# Patient Record
Sex: MALE | Race: WHITE | NOT HISPANIC OR LATINO | Employment: UNEMPLOYED | URBAN - METROPOLITAN AREA
[De-identification: names, ages, dates, MRNs, and addresses within clinical notes are randomized per-mention and may not be internally consistent; named-entity substitution may affect disease eponyms.]

---
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
## 2020-05-21 ENCOUNTER — OFFICE VISIT (OUTPATIENT)
Dept: DERMATOLOGY | Facility: CLINIC | Age: 20
End: 2020-05-21
Payer: COMMERCIAL

## 2020-05-21 VITALS — TEMPERATURE: 98.1 F | HEIGHT: 73 IN | WEIGHT: 260 LBS | BODY MASS INDEX: 34.46 KG/M2

## 2020-05-21 DIAGNOSIS — D22.9 BENIGN MOLE: Primary | ICD-10-CM

## 2020-05-21 DIAGNOSIS — D48.5 NEOPLASM OF UNCERTAIN BEHAVIOR OF SKIN: ICD-10-CM

## 2020-05-21 PROCEDURE — 99203 OFFICE O/P NEW LOW 30 MIN: CPT | Performed by: DERMATOLOGY

## 2020-05-21 PROCEDURE — 88305 TISSUE EXAM BY PATHOLOGIST: CPT | Performed by: STUDENT IN AN ORGANIZED HEALTH CARE EDUCATION/TRAINING PROGRAM

## 2020-05-21 PROCEDURE — 11102 TANGNTL BX SKIN SINGLE LES: CPT | Performed by: DERMATOLOGY

## 2020-06-04 DIAGNOSIS — Z20.822 CLOSE EXPOSURE TO COVID-19 VIRUS: ICD-10-CM

## 2020-06-04 PROCEDURE — U0003 INFECTIOUS AGENT DETECTION BY NUCLEIC ACID (DNA OR RNA); SEVERE ACUTE RESPIRATORY SYNDROME CORONAVIRUS 2 (SARS-COV-2) (CORONAVIRUS DISEASE [COVID-19]), AMPLIFIED PROBE TECHNIQUE, MAKING USE OF HIGH THROUGHPUT TECHNOLOGIES AS DESCRIBED BY CMS-2020-01-R: HCPCS

## 2020-06-06 LAB — SARS-COV-2 RNA SPEC QL NAA+PROBE: NOT DETECTED

## 2020-11-24 ENCOUNTER — CONSULT (OUTPATIENT)
Dept: PLASTIC SURGERY | Facility: CLINIC | Age: 20
End: 2020-11-24
Payer: COMMERCIAL

## 2020-11-24 VITALS
WEIGHT: 245 LBS | HEIGHT: 73 IN | DIASTOLIC BLOOD PRESSURE: 82 MMHG | SYSTOLIC BLOOD PRESSURE: 126 MMHG | BODY MASS INDEX: 32.47 KG/M2

## 2020-11-24 DIAGNOSIS — L98.9 SCALP LESION: Primary | ICD-10-CM

## 2020-11-24 PROCEDURE — 99203 OFFICE O/P NEW LOW 30 MIN: CPT | Performed by: STUDENT IN AN ORGANIZED HEALTH CARE EDUCATION/TRAINING PROGRAM

## 2020-11-24 RX ORDER — IBUPROFEN 200 MG
600 TABLET ORAL EVERY 6 HOURS PRN
COMMUNITY

## 2020-12-11 ENCOUNTER — ANESTHESIA EVENT (OUTPATIENT)
Dept: PERIOP | Facility: HOSPITAL | Age: 20
End: 2020-12-11
Payer: COMMERCIAL

## 2020-12-16 ENCOUNTER — ANESTHESIA (OUTPATIENT)
Dept: PERIOP | Facility: HOSPITAL | Age: 20
End: 2020-12-16
Payer: COMMERCIAL

## 2020-12-23 ENCOUNTER — HOSPITAL ENCOUNTER (OUTPATIENT)
Facility: HOSPITAL | Age: 20
Setting detail: OUTPATIENT SURGERY
Discharge: HOME | End: 2020-12-23
Attending: STUDENT IN AN ORGANIZED HEALTH CARE EDUCATION/TRAINING PROGRAM | Admitting: STUDENT IN AN ORGANIZED HEALTH CARE EDUCATION/TRAINING PROGRAM
Payer: COMMERCIAL

## 2020-12-23 VITALS
OXYGEN SATURATION: 99 % | DIASTOLIC BLOOD PRESSURE: 56 MMHG | HEIGHT: 73 IN | BODY MASS INDEX: 33.13 KG/M2 | HEART RATE: 55 BPM | SYSTOLIC BLOOD PRESSURE: 113 MMHG | RESPIRATION RATE: 18 BRPM | TEMPERATURE: 98 F | WEIGHT: 250 LBS

## 2020-12-23 VITALS — HEART RATE: 73 BPM

## 2020-12-23 DIAGNOSIS — L98.9 SCALP LESION: Primary | ICD-10-CM

## 2020-12-23 PROCEDURE — 11421 EXC H-F-NK-SP B9+MARG 0.6-1: CPT | Performed by: STUDENT IN AN ORGANIZED HEALTH CARE EDUCATION/TRAINING PROGRAM

## 2020-12-23 PROCEDURE — 12031 INTMD RPR S/A/T/EXT 2.5 CM/<: CPT | Performed by: STUDENT IN AN ORGANIZED HEALTH CARE EDUCATION/TRAINING PROGRAM

## 2020-12-23 PROCEDURE — 99024 POSTOP FOLLOW-UP VISIT: CPT | Performed by: STUDENT IN AN ORGANIZED HEALTH CARE EDUCATION/TRAINING PROGRAM

## 2020-12-23 PROCEDURE — 88305 TISSUE EXAM BY PATHOLOGIST: CPT | Performed by: PATHOLOGY

## 2020-12-23 RX ORDER — LIDOCAINE HYDROCHLORIDE AND EPINEPHRINE 20; 5 MG/ML; UG/ML
INJECTION, SOLUTION EPIDURAL; INFILTRATION; INTRACAUDAL; PERINEURAL AS NEEDED
Status: DISCONTINUED | OUTPATIENT
Start: 2020-12-23 | End: 2020-12-23 | Stop reason: HOSPADM

## 2020-12-23 RX ORDER — HYDROMORPHONE HCL/PF 1 MG/ML
0.2 SYRINGE (ML) INJECTION
Status: DISCONTINUED | OUTPATIENT
Start: 2020-12-23 | End: 2020-12-23 | Stop reason: HOSPADM

## 2020-12-23 RX ORDER — FENTANYL CITRATE/PF 50 MCG/ML
25 SYRINGE (ML) INJECTION
Status: DISCONTINUED | OUTPATIENT
Start: 2020-12-23 | End: 2020-12-23 | Stop reason: HOSPADM

## 2020-12-23 RX ORDER — CEFAZOLIN SODIUM 1 G/3ML
INJECTION, POWDER, FOR SOLUTION INTRAMUSCULAR; INTRAVENOUS AS NEEDED
Status: DISCONTINUED | OUTPATIENT
Start: 2020-12-23 | End: 2020-12-23

## 2020-12-23 RX ORDER — LIDOCAINE HYDROCHLORIDE 10 MG/ML
0.5 INJECTION, SOLUTION EPIDURAL; INFILTRATION; INTRACAUDAL; PERINEURAL ONCE AS NEEDED
Status: DISCONTINUED | OUTPATIENT
Start: 2020-12-23 | End: 2020-12-23 | Stop reason: HOSPADM

## 2020-12-23 RX ORDER — FENTANYL CITRATE 50 UG/ML
INJECTION, SOLUTION INTRAMUSCULAR; INTRAVENOUS AS NEEDED
Status: DISCONTINUED | OUTPATIENT
Start: 2020-12-23 | End: 2020-12-23

## 2020-12-23 RX ORDER — ONDANSETRON 2 MG/ML
4 INJECTION INTRAMUSCULAR; INTRAVENOUS ONCE AS NEEDED
Status: DISCONTINUED | OUTPATIENT
Start: 2020-12-23 | End: 2020-12-23 | Stop reason: HOSPADM

## 2020-12-23 RX ORDER — SODIUM CHLORIDE, SODIUM LACTATE, POTASSIUM CHLORIDE, CALCIUM CHLORIDE 600; 310; 30; 20 MG/100ML; MG/100ML; MG/100ML; MG/100ML
125 INJECTION, SOLUTION INTRAVENOUS CONTINUOUS
Status: DISCONTINUED | OUTPATIENT
Start: 2020-12-23 | End: 2020-12-23 | Stop reason: HOSPADM

## 2020-12-23 RX ORDER — MIDAZOLAM HYDROCHLORIDE 2 MG/2ML
INJECTION, SOLUTION INTRAMUSCULAR; INTRAVENOUS AS NEEDED
Status: DISCONTINUED | OUTPATIENT
Start: 2020-12-23 | End: 2020-12-23

## 2020-12-23 RX ORDER — ONDANSETRON 2 MG/ML
INJECTION INTRAMUSCULAR; INTRAVENOUS AS NEEDED
Status: DISCONTINUED | OUTPATIENT
Start: 2020-12-23 | End: 2020-12-23

## 2020-12-23 RX ADMIN — CEFAZOLIN 2000 MG: 1 INJECTION, POWDER, FOR SOLUTION INTRAVENOUS at 08:45

## 2020-12-23 RX ADMIN — FENTANYL CITRATE 50 MCG: 50 INJECTION INTRAMUSCULAR; INTRAVENOUS at 08:46

## 2020-12-23 RX ADMIN — MIDAZOLAM 1 MG: 1 INJECTION INTRAMUSCULAR; INTRAVENOUS at 08:36

## 2020-12-23 RX ADMIN — SODIUM CHLORIDE, SODIUM LACTATE, POTASSIUM CHLORIDE, AND CALCIUM CHLORIDE: .6; .31; .03; .02 INJECTION, SOLUTION INTRAVENOUS at 07:46

## 2020-12-23 RX ADMIN — MIDAZOLAM 1 MG: 1 INJECTION INTRAMUSCULAR; INTRAVENOUS at 08:32

## 2020-12-23 RX ADMIN — ONDANSETRON 4 MG: 2 INJECTION INTRAMUSCULAR; INTRAVENOUS at 08:47

## 2020-12-23 RX ADMIN — FENTANYL CITRATE 25 MCG: 50 INJECTION INTRAMUSCULAR; INTRAVENOUS at 08:38

## 2020-12-23 RX ADMIN — FENTANYL CITRATE 25 MCG: 50 INJECTION INTRAMUSCULAR; INTRAVENOUS at 08:58

## 2021-03-10 DIAGNOSIS — Z23 ENCOUNTER FOR IMMUNIZATION: ICD-10-CM

## 2021-03-14 ENCOUNTER — IMMUNIZATIONS (OUTPATIENT)
Dept: FAMILY MEDICINE CLINIC | Facility: HOSPITAL | Age: 21
End: 2021-03-14

## 2021-03-14 DIAGNOSIS — Z23 ENCOUNTER FOR IMMUNIZATION: Primary | ICD-10-CM

## 2021-03-14 PROCEDURE — 91300 SARS-COV-2 / COVID-19 MRNA VACCINE (PFIZER-BIONTECH) 30 MCG: CPT

## 2021-03-14 PROCEDURE — 0001A SARS-COV-2 / COVID-19 MRNA VACCINE (PFIZER-BIONTECH) 30 MCG: CPT

## 2021-04-05 ENCOUNTER — IMMUNIZATIONS (OUTPATIENT)
Dept: FAMILY MEDICINE CLINIC | Facility: HOSPITAL | Age: 21
End: 2021-04-05

## 2021-04-05 DIAGNOSIS — Z23 ENCOUNTER FOR IMMUNIZATION: Primary | ICD-10-CM

## 2021-04-05 PROCEDURE — 91300 SARS-COV-2 / COVID-19 MRNA VACCINE (PFIZER-BIONTECH) 30 MCG: CPT

## 2021-04-05 PROCEDURE — 0002A SARS-COV-2 / COVID-19 MRNA VACCINE (PFIZER-BIONTECH) 30 MCG: CPT

## 2022-04-27 ENCOUNTER — OFFICE VISIT (OUTPATIENT)
Dept: DERMATOLOGY | Facility: CLINIC | Age: 22
End: 2022-04-27
Payer: COMMERCIAL

## 2022-04-27 VITALS — WEIGHT: 254 LBS | BODY MASS INDEX: 33.66 KG/M2 | HEIGHT: 73 IN | TEMPERATURE: 98.4 F

## 2022-04-27 DIAGNOSIS — B07.9 VERRUCA VULGARIS: Primary | ICD-10-CM

## 2022-04-27 PROCEDURE — 99214 OFFICE O/P EST MOD 30 MIN: CPT | Performed by: DERMATOLOGY

## 2022-04-27 RX ORDER — SALICYLIC ACID 285 MG/ML
SOLUTION TOPICAL
Qty: 10 ML | Refills: 3 | Status: SHIPPED | OUTPATIENT
Start: 2022-04-27 | End: 2022-06-25 | Stop reason: ALTCHOICE

## 2022-04-27 NOTE — PATIENT INSTRUCTIONS
1  VERRUCA VULGARIS ("Common Warts")    Assessment and Plan:  Based on a thorough discussion of this condition and the management approach to it (including a comprehensive discussion of the known risks, side effects and potential benefits of treatment), the patient (family) agrees to implement the following specific plan:   Discussed treatment options such as laser, topical medication, topical medication, or freezing   UltraSal-ER external Solution 28 5%- apply topically 1-2 times daily to warts until warts are resolved   If no improvement please let us know in a couple months after using the topical medication  Verruca Vulgaris  A verruca is a common growth of the skin caused by infection by human papilloma virus (HPV)  There are many strains of the virus that cause different types of warts on the body  The virus infects the most superficial layers of the skin, causing increased production of skin cells and thickening  Warts can be spread through direct contact with infected skin and may spread to other parts of the body if scratched or picked  A verruca is more commonly called a "wart " Warts are particularly common in school-aged children but can arise at any age  Patients who have a history of eczema are especially prone due to impaired skin barrier  Those taking immunosuppressive drugs or with HIV infections may experience prolonged symptoms despite treatment  Warts generally have a rough surface with a tiny black dot sometimes observed in the middle of each scaly spot  They can range in size from a small bump to large scaly growths  Common warts are often found on the backs of fingers or toes, around the nails, and on the knees  Plantar warts can grow inwardly on the soles of the feet causing pain  There are many possible ways to treat warts and sometimes several different treatments are needed to get the warts to go away completely   There is no single perfect treatment for warts, and successful treatment can take many months  In-office treatments usually require multiple visits, and include:  1) Cryotherapy  a cold spray with liquid nitrogen will destroy the infected cells but may lead to discomfort and blistering  It may also leave a permanent white betito or scar  2) Electrosurgery (curettage and cautery) can be used for large resistant warts which involves shaving the growth down and burning the base  It is performed under local anesthesia and may leave a permanent scar    3) Candida (yeast) antigen injections  These are extracts of the common yeast (Candida) that cannot cause an infection  The medication is injected into/under the wart  It is thought to stimulate the immune system to recognize the wart virus and attack it  Multiple injections are often needed about one month apart  There are also several at-home wart treatments:    1) Soak the warts in warm water for 5 minutes every night followed by gentle filing with a nail file or pumice stone  2) Topical salicylic acid or similar compounds work by removing the dead surface skin cells  a  Apply the medicine directly to the wart, wait for it to dry completely, then cover with duct tape overnight   b  Repeat until the wart is gone, which can take 2-4 months  c  Do not use on the face or groin area   d  If the wart paint makes the skin sore, stop treatment until the discomfort has settled, then recommence as above   e  Take care to keep the chemical off normal skin  3) Podophyllin is a cytotoxic agent used in some products and must not be used in pregnancy or women considering pregnancy  4) Some prescription medications include   a  Topical retinoids (adapalene, tretinoin, tazarotene), 5-fluorouracil (Efudex) or imiquimod (Aldara) creams are sometimes used to treat flat warts or warts on the face and other sensitive anatomical areas   They are usually applied directly to the warts once a day for 2-4 months and can be irritating  These treatments should only be used as directed by your health care provider  b  Systemic treatment with oral cimetidine (Tagamet) may help boost the immune system against the wart virus in patients, some of the time  Initiation of cimetidine therapy should ONLY be done under the supervision of your health care provider, who can discuss possible side effects and drug-to-drug interactions of this specific treatment

## 2022-04-27 NOTE — PROGRESS NOTES
[facility] Follow Up Note    Patient Name: Demetrice Connor  Encounter Date: 04/27/2022    Today's Chief Concerns:  [facility] Concern #1:  Follow up [place]       Current Medications:    Current Outpatient Medications:     ibuprofen (MOTRIN) 200 mg tablet, Take 600 mg by mouth every 6 (six) hours as needed for mild pain , Disp: , Rfl:     CONSTITUTIONAL:   Vitals:    04/27/22 1442   Temp: 98 4 °F (36 9 °C)   TempSrc: Temporal   Weight: 115 kg (254 lb)   Height: 6' 1" (1 854 m)       Specific Alerts:    Have you been seen by a [facility] Dermatologist in the last 3 years? YES    Are you pregnant or planning to become pregnant? N/A    Are you currently or planning to be nursing or breast feeding? N/A    No Known Allergies    May we call your Preferred Phone number to discuss your specific medical information? YES    May we leave a detailed message that includes your specific medical information? YES    Have you traveled outside of the [facility] in the past 3 months? No    Do you currently have a pacemaker or defibrillator? No    Do you have any artificial heart valves, joints, plates, screws, rods, stents, pins, etc? No   - If Yes, were any placed within the last 2 years? Do you require any medications prior to a surgical procedure? No    Are you taking any medications that cause you to bleed more easily ("blood thinners") No    Have you ever experienced a rapid heartbeat with epinephrine? No    Have you ever been treated with "gold" (gold sodium thiomalate) therapy? No    [facility] can help with wrinkles, "laugh lines," facial volume loss, "double chin," "love handles," age spots, and more  Are you interested in learning today about some of the skin enhancement procedures that we offer? (If Yes, please provide more detail) No    Review of Systems:  Have you recently had or currently have any of the following?     · Fever or chills: No  · Night Sweats: No  · Headaches: No  · Weight Gain: No  · Weight Loss: No  · Blurry Vision: No  · Nausea: No  · Vomiting: No  · Diarrhea: No  · Blood in Stool: No  · Abdominal Pain: No  · Itchy Skin: No  · Painful Joints: No  · Swollen Joints: No  · Muscle Pain: No  · Irregular Mole: No  · Sun Burn: No  · Dry Skin: No  · Skin Color Changes: No  · Scar or Keloid: No  · Cold Sores/Fever Blisters: No  · Bacterial Infections/MRSA: No  · Anxiety: No  · Depression: No  · Suicidal or Homicidal Thoughts: No      PSYCH: Normal mood and affect  EYES: Normal conjunctiva  ENT: Normal lips and oral mucosa  CARDIOVASCULAR: No edema  RESPIRATORY: Normal respirations      1  VERRUCA VULGARIS ("Common Warts")    Physical Exam:   Anatomic Location Affected:  6 on right hand, 2 on left hand, 1 on right knee, and 1 on left shin    Morphological Description:  Verruca papule (see photographs)   Pertinent Positives:   Pertinent Negatives: Additional History of Present Condition:  Presents for 1 year  Patient has tried OTC wart removal treatments with no relief  Assessment and Plan:  Based on a thorough discussion of this condition and the management approach to it (including a comprehensive discussion of the known risks, side effects and potential benefits of treatment), the patient (family) agrees to implement the following specific plan:   Discussed treatment options such as laser, topical medication, topical medication, or freezing   UltraSal-ER external Solution 28 5%- apply topically 1-2 times daily to warts until warts are resolved   If no improvement please let us know in a couple months after using the topical medication  Verruca Vulgaris  A verruca is a common growth of the skin caused by infection by human papilloma virus (HPV)  There are many strains of the virus that cause different types of warts on the body   The virus infects the most superficial layers of the skin, causing increased production of skin cells and thickening  Warts can be spread through direct contact with infected skin and may spread to other parts of the body if scratched or picked  A verruca is more commonly called a "wart " Warts are particularly common in school-aged children but can arise at any age  Patients who have a history of eczema are especially prone due to impaired skin barrier  Those taking immunosuppressive drugs or with HIV infections may experience prolonged symptoms despite treatment  Warts generally have a rough surface with a tiny black dot sometimes observed in the middle of each scaly spot  They can range in size from a small bump to large scaly growths  Common warts are often found on the backs of fingers or toes, around the nails, and on the knees  Plantar warts can grow inwardly on the soles of the feet causing pain  There are many possible ways to treat warts and sometimes several different treatments are needed to get the warts to go away completely  There is no single perfect treatment for warts, and successful treatment can take many months  In-office treatments usually require multiple visits, and include:  1) Cryotherapy  a cold spray with liquid nitrogen will destroy the infected cells but may lead to discomfort and blistering  It may also leave a permanent white betito or scar  2) Electrosurgery (curettage and cautery) can be used for large resistant warts which involves shaving the growth down and burning the base  It is performed under local anesthesia and may leave a permanent scar    3) Candida (yeast) antigen injections  These are extracts of the common yeast (Candida) that cannot cause an infection  The medication is injected into/under the wart  It is thought to stimulate the immune system to recognize the wart virus and attack it  Multiple injections are often needed about one month apart      There are also several at-home wart treatments:    1) Soak the warts in warm water for 5 minutes every night followed by gentle filing with a nail file or pumice stone  2) Topical salicylic acid or similar compounds work by removing the dead surface skin cells  a  Apply the medicine directly to the wart, wait for it to dry completely, then cover with duct tape overnight   b  Repeat until the wart is gone, which can take 2-4 months  c  Do not use on the face or groin area   d  If the wart paint makes the skin sore, stop treatment until the discomfort has settled, then recommence as above   e  Take care to keep the chemical off normal skin  3) Podophyllin is a cytotoxic agent used in some products and must not be used in pregnancy or women considering pregnancy  4) Some prescription medications include   a  Topical retinoids (adapalene, tretinoin, tazarotene), 5-fluorouracil (Efudex) or imiquimod (Aldara) creams are sometimes used to treat flat warts or warts on the face and other sensitive anatomical areas  They are usually applied directly to the warts once a day for 2-4 months and can be irritating  These treatments should only be used as directed by your health care provider  b  Systemic treatment with oral cimetidine (Tagamet) may help boost the immune system against the wart virus in patients, some of the time  Initiation of cimetidine therapy should ONLY be done under the supervision of your health care provider, who can discuss possible side effects and drug-to-drug interactions of this specific treatment         Scribe Attestation    I,:  Gavin Sharpe am acting as a scribe while in the presence of the attending physician :       I,:  Evans Shelton MD personally performed the services described in this documentation    as scribed in my presence :

## 2022-05-04 ENCOUNTER — TELEPHONE (OUTPATIENT)
Dept: DERMATOLOGY | Facility: CLINIC | Age: 22
End: 2022-05-04

## 2022-05-04 NOTE — TELEPHONE ENCOUNTER
Patient's mom called regarding medication not covered  Please advise  Hi, my name is Axel Rudy  I'm calling in reference to my son, Torres Barajas is his date of birth  He was seen at your office and was prescribed a medication  Apparently when he went to [facility], because our insurance is through  [facility], it is not a covered medication  I'm not sure what the protocol is  If the pharmacy contact you  But we were just wondering, is there another medication that can be called in or could the pharmacy possibly get a prior authorization on that medication you can call [employer]  It's in his chart  You can contact him through my chart or you can call me  My name is Shahid Sher [phone]  Thank you

## 2022-05-04 NOTE — TELEPHONE ENCOUNTER
Spoke with the patient and he is aware that he can try compound W or any similar OTC wart treatment  Patient is aware and has no further questions or concerns at this time

## 2022-06-25 ENCOUNTER — AMB VIDEO VISIT (OUTPATIENT)
Dept: OTHER | Facility: HOSPITAL | Age: 22
End: 2022-06-25
Payer: COMMERCIAL

## 2022-06-25 DIAGNOSIS — L25.5 RHUS DERMATITIS: Primary | ICD-10-CM

## 2022-06-25 PROBLEM — Z98.890 HISTORY OF PHOTOREFRACTIVE KERATECTOMY (PRK): Status: ACTIVE | Noted: 2022-06-25

## 2022-06-25 PROCEDURE — 99212 OFFICE O/P EST SF 10 MIN: CPT | Performed by: PHYSICIAN ASSISTANT

## 2022-06-25 RX ORDER — PREDNISOLONE ACETATE 10 MG/ML
1 SUSPENSION/ DROPS OPHTHALMIC 4 TIMES DAILY
COMMUNITY

## 2022-06-25 RX ORDER — PREDNISONE 10 MG/1
TABLET ORAL
Qty: 30 TABLET | Refills: 0 | Status: SHIPPED | OUTPATIENT
Start: 2022-06-25 | End: 2022-07-05

## 2022-06-25 NOTE — CARE ANYWHERE EVISITS
Visit Summary for JADE STINSON - Gender: Male - Date of Birth: [birth year]  Date: [id number] - Duration: 8 minutes  Patient: Luís STINSON  Provider: Mendel Hallman PA-C    Patient Contact Information  Address  [address]; [address]  [phone]    Visit Topics  Rash [Added By: Self - 2022-06-25]    Triage Questions   What is your current physical address in the event of a medical emergency? Answer []  Are you allergic to any medications? Answer []  Are you now or could you be pregnant? Answer []  Do you have any immune system compromise or chronic lung   disease? Answer []  Do you have any vulnerable family members in the home (infant, pregnant, cancer, elderly)? Answer []     Conversation Transcripts  [0A][0A] [Notification] You are connected with Mendel Hallman PA-C, Urgent Care Specialist [0A][Notification] Dollene Rubinstein is located in [place]  [0A][Notification] Dollene Rubinstein has shared health history  Yadiel Membreno  [0A]    Diagnosis  Unspecified contact dermatitis due to plants, except food    Procedures  Value: [id number] Code: CPT-4 UNLISTED E&M SERVICE    Medications Prescribed    No prescriptions ordered    Electronically signed by: Dasha Briceno(NPI [id number])

## 2022-06-25 NOTE — PATIENT INSTRUCTIONS
The prescription steroid should help decrease the reaction to the poison ivy    You can use over-the-counter Caladryl drill cream to help dry out the rash and help with itching  You could take over-the-counter Claritin, pepcid and Benadryl to help with the itching as well  You can use Zanfel or Technu soap to wash the oil from the plant off the skin  Patient to wash any clothing/shoes/linens that may have come into contact with the oils  Never used steroid cream on the face or genitals  You can use steroid (hydrocortisone) cream in small amounts as needed for itching

## 2022-06-25 NOTE — PROGRESS NOTES
Video Visit - Smita Navarrete 24 y o  male MRN: [id number]    REQUIRED DOCUMENTATION:         1  This service was provided via [employer]  2  Provider located at [address] [place] [address]  3  [facility] provider: Sagar Quintana PA-C   4  Identify all parties [facility]:  significant other-permission granted  5  After connecting through [employer], patient was identified by name and date of birth  Patient was then informed that this was a Telemedicine visit and that the exam was being conducted confidentially over secure lines  My office door was closed  No one else was in the room  Patient acknowledged consent and understanding of privacy and security of the Telemedicine visit  I informed the patient that I have reviewed their record in Epic and presented the opportunity for them to ask any questions regarding the visit today  The patient agreed to participate  VITALS: Heart Rate: 77 BPM, Respiratory Rate: 12 RPM, Temperature Unavailable° F, Blood Pressure Unavailable mmHg, Pulse Ox Unavailable % on RA    HPI  Pt reports posion ivy on BLE, BUE, trunk, neck, genitals x 2 days after working outside in brush  [employer] without relief  Physical Exam  Constitutional:       General: He is not in acute distress  Appearance: Normal appearance  He is not toxic-appearing  HENT:      Head: Normocephalic and atraumatic  Nose: No rhinorrhea  Mouth/Throat:      Mouth: Mucous membranes are moist    Eyes:      Conjunctiva/sclera: Conjunctivae normal    Pulmonary:      Effort: Pulmonary effort is normal  No respiratory distress  Breath sounds: No wheezing (no gross audible wheeze through computer)  Musculoskeletal:      Cervical back: Normal range of motion  Skin:     Findings: Rash (L forearm demonstrates linear vesicular rash  BLE and neck with scattered areas of erythema) present  Neurological:      Mental Status: He is alert  Cranial Nerves: No dysarthria or facial asymmetry  Psychiatric:         Mood and Affect: Mood normal          Behavior: Behavior normal          Diagnoses and all orders for this visit:    Rhus dermatitis  -     predniSONE 10 mg tablet; Take 5 tablets (50 mg total) by mouth daily for 2 days, THEN 4 tablets (40 mg total) daily for 2 days, THEN 3 tablets (30 mg total) daily for 2 days, THEN 2 tablets (20 mg total) daily for 2 days, THEN 1 tablet (10 mg total) daily for 2 days  Patient Instructions   The prescription steroid should help decrease the reaction to the poison ivy    You can use over-the-counter Caladryl drill cream to help dry out the rash and help with itching  You could take over-the-counter Claritin, pepcid and Benadryl to help with the itching as well  You can use Zanfel or Technu soap to wash the oil from the plant off the skin  Patient to wash any clothing/shoes/linens that may have come into contact with the oils  Never used steroid cream on the face or genitals  You can use steroid (hydrocortisone) cream in small amounts as needed for itching

## 2023-09-12 DIAGNOSIS — L03.90 CELLULITIS, UNSPECIFIED CELLULITIS SITE: Primary | ICD-10-CM

## 2023-09-12 RX ORDER — CEPHALEXIN 500 MG/1
500 CAPSULE ORAL EVERY 8 HOURS SCHEDULED
Qty: 30 CAPSULE | Refills: 0 | Status: SHIPPED | OUTPATIENT
Start: 2023-09-12 | End: 2023-09-22

## 2024-10-16 ENCOUNTER — HOSPITAL ENCOUNTER (EMERGENCY)
Facility: HOSPITAL | Age: 24
Discharge: HOME | End: 2024-10-16
Attending: EMERGENCY MEDICINE
Payer: COMMERCIAL

## 2024-10-16 ENCOUNTER — APPOINTMENT (EMERGENCY)
Dept: RADIOLOGY | Facility: HOSPITAL | Age: 24
End: 2024-10-16
Payer: COMMERCIAL

## 2024-10-16 VITALS
OXYGEN SATURATION: 97 % | TEMPERATURE: 98.9 F | DIASTOLIC BLOOD PRESSURE: 98 MMHG | SYSTOLIC BLOOD PRESSURE: 176 MMHG | HEART RATE: 72 BPM | RESPIRATION RATE: 18 BRPM

## 2024-10-16 DIAGNOSIS — T07.XXXA ABRASIONS OF MULTIPLE SITES: ICD-10-CM

## 2024-10-16 DIAGNOSIS — S92.405A NONDISPLACED UNSPECIFIED FRACTURE OF LEFT GREAT TOE, INITIAL ENCOUNTER FOR CLOSED FRACTURE: ICD-10-CM

## 2024-10-16 DIAGNOSIS — S30.0XXA CONTUSION OF BUTTOCK, INITIAL ENCOUNTER: ICD-10-CM
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
## 2024-10-16 DIAGNOSIS — S70.01XA CONTUSION OF RIGHT HIP, INITIAL ENCOUNTER: ICD-10-CM

## 2024-10-16 PROCEDURE — 73630 X-RAY EXAM OF FOOT: CPT

## 2024-10-16 PROCEDURE — 99285 EMERGENCY DEPT VISIT HI MDM: CPT | Performed by: EMERGENCY MEDICINE

## 2024-10-16 PROCEDURE — 73590 X-RAY EXAM OF LOWER LEG: CPT

## 2024-10-16 PROCEDURE — 99284 EMERGENCY DEPT VISIT MOD MDM: CPT

## 2024-10-16 RX ORDER — ACETAMINOPHEN 325 MG/1
650 TABLET ORAL ONCE
Status: COMPLETED | OUTPATIENT
Start: 2024-10-16 | End: 2024-10-16

## 2024-10-16 RX ADMIN — ACETAMINOPHEN 650 MG: 325 TABLET, FILM COATED ORAL at 18:59
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
## 2024-10-16 NOTE — DISCHARGE INSTRUCTIONS
You should take Motrin, Tylenol, and apply ice to your left toe for pain.  You should elevate it to reduce swelling.  You can shabnam tape it to the adjacent toe for support.  You should follow-up with podiatry for ongoing problems related to your toe.  For your abrasions, keep them clean and dry.  You should return to the emergency department for abdominal pain, vomiting, or any other concerns

## 2024-10-22 ENCOUNTER — OFFICE VISIT (OUTPATIENT)
Dept: INTERNAL MEDICINE CLINIC | Facility: OTHER | Age: 24
End: 2024-10-22
Payer: COMMERCIAL

## 2024-10-22 VITALS
BODY MASS INDEX: 28.23 KG/M2 | OXYGEN SATURATION: 98 % | TEMPERATURE: 98.3 F | WEIGHT: 214 LBS | SYSTOLIC BLOOD PRESSURE: 128 MMHG | DIASTOLIC BLOOD PRESSURE: 80 MMHG | HEART RATE: 77 BPM

## 2024-10-22 DIAGNOSIS — Z13.228 SCREENING FOR METABOLIC DISORDER: Primary | ICD-10-CM
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
## 2024-10-22 DIAGNOSIS — S92.425D CLOSED NONDISPLACED FRACTURE OF DISTAL PHALANX OF LEFT GREAT TOE WITH ROUTINE HEALING, SUBSEQUENT ENCOUNTER: Primary | ICD-10-CM

## 2024-10-22 DIAGNOSIS — Z76.89 ENCOUNTER TO ESTABLISH CARE: ICD-10-CM

## 2024-10-22 DIAGNOSIS — M25.511 ACUTE PAIN OF RIGHT SHOULDER: ICD-10-CM

## 2024-10-22 PROBLEM — Z98.890 HISTORY OF PHOTOREFRACTIVE KERATECTOMY (PRK): Status: RESOLVED | Noted: 2022-06-25 | Resolved: 2024-10-22

## 2024-10-22 PROCEDURE — 99203 OFFICE O/P NEW LOW 30 MIN: CPT
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
## 2024-11-13 ENCOUNTER — OFFICE VISIT (OUTPATIENT)
Dept: INTERNAL MEDICINE CLINIC | Facility: OTHER | Age: 24
End: 2024-11-13
Payer: COMMERCIAL

## 2024-11-13 VITALS
DIASTOLIC BLOOD PRESSURE: 70 MMHG | WEIGHT: 213.6 LBS | HEART RATE: 82 BPM | TEMPERATURE: 99.4 F | OXYGEN SATURATION: 98 % | HEIGHT: 73 IN | SYSTOLIC BLOOD PRESSURE: 120 MMHG | BODY MASS INDEX: 28.31 KG/M2

## 2024-11-13 DIAGNOSIS — Z23 ENCOUNTER FOR IMMUNIZATION: ICD-10-CM

## 2024-11-13 DIAGNOSIS — Z00.00 ANNUAL PHYSICAL EXAM: Primary | ICD-10-CM

## 2024-11-13 PROCEDURE — 90471 IMMUNIZATION ADMIN: CPT

## 2024-11-13 PROCEDURE — 90651 9VHPV VACCINE 2/3 DOSE IM: CPT

## 2024-11-13 PROCEDURE — 99395 PREV VISIT EST AGE 18-39: CPT

## 2024-11-13 NOTE — PATIENT INSTRUCTIONS
"Patient Education     Routine physical for adults   The Basics   Written by the doctors and editors at [facility]   What is a physical? -- A physical is a routine visit, or \"check-up,\" with your doctor. You might also hear it called a \"wellness visit\" or \"preventive visit.\"  During each visit, the doctor will:   Ask about your physical and mental health   Ask about your habits, behaviors, and lifestyle   Do an exam   Give you vaccines if needed   Talk to you about any medicines you take   Give advice about your health   Answer your questions  Getting regular check-ups is an important part of taking care of your health. It can help your doctor find and treat any problems you have. But it's also important for preventing health problems.  A routine physical is different from a \"sick visit.\" A sick visit is when you see a doctor because of a health concern or problem. Since physicals are scheduled ahead of time, you can think about what you want to ask the doctor.  How often should I get a physical? -- It depends on your age and health. In general, for people age 21 years and older:   If you are younger than 50 years, you might be able to get a physical every 3 years.   If you are 50 years or older, your doctor might recommend a physical every year.  If you have an ongoing health condition, like diabetes or high blood pressure, your doctor will probably want to see you more often.  What happens during a physical? -- In general, each visit will include:   Physical exam - The doctor or nurse will check your height, weight, heart rate, and blood pressure. They will also look at your eyes and ears. They will ask about how you are feeling and whether you have any symptoms that bother you.   Medicines - It's a good idea to bring a list of all the medicines you take to each doctor visit. Your doctor will talk to you about your medicines and answer any questions. Tell them if you are having any side effects that bother you. You " "should also tell them if you are having trouble paying for any of your medicines.   Habits and behaviors - This includes:   Your diet   Your exercise habits   Whether you smoke, drink alcohol, or use drugs   Whether you are sexually active   Whether you feel safe at home  Your doctor will talk to you about things you can do to improve your health and lower your risk of health problems. They will also offer help and support. For example, if you want to quit smoking, they can give you advice and might prescribe medicines. If you want to improve your diet or get more physical activity, they can help you with this, too.   Lab tests, if needed - The tests you get will depend on your age and situation. For example, your doctor might want to check your:   Cholesterol   Blood sugar   Iron level   Vaccines - The recommended vaccines will depend on your age, health, and what vaccines you already had. Vaccines are very important because they can prevent certain serious or deadly infections.   Discussion of screening - \"Screening\" means checking for diseases or other health problems before they cause symptoms. Your doctor can recommend screening based on your age, risk, and preferences. This might include tests to check for:   Cancer, such as breast, prostate, cervical, ovarian, colorectal, prostate, lung, or skin cancer   Sexually transmitted infections, such as chlamydia and gonorrhea   Mental health conditions like depression and anxiety  Your doctor will talk to you about the different types of screening tests. They can help you decide which screenings to have. They can also explain what the results might mean.   Answering questions - The physical is a good time to ask the doctor or nurse questions about your health. If needed, they can refer you to other doctors or specialists, too.  Adults older than 65 years often need other care, too. As you get older, your doctor will talk to you about:   How to prevent falling at " home   Hearing or vision tests   Memory testing   How to take your medicines safely   Making sure that you have the help and support you need at home  All topics are updated as new evidence becomes available and our peer review process is complete.  This topic retrieved from [employer] on: May 02, 2024.  Topic 034334 Version 1.0  Release: 32.4.3 - C32.122  © 2024 UpToDate, Inc. and/or its affiliates. All rights reserved.  Consumer Information Use and Disclaimer   Disclaimer: This generalized information is a limited summary of diagnosis, treatment, and/or medication information. It is not meant to be comprehensive and should be used as a tool to help the user understand and/or assess potential diagnostic and treatment options. It does NOT include all information about conditions, treatments, medications, side effects, or risks that may apply to a specific patient. It is not intended to be medical advice or a substitute for the medical advice, diagnosis, or treatment of a health care provider based on the health care provider's examination and assessment of a patient's specific and unique circumstances. Patients must speak with a health care provider for complete information about their health, medical questions, and treatment options, including any risks or benefits regarding use of medications. This information does not endorse any treatments or medications as safe, effective, or approved for treating a specific patient. UpToDate, Inc. and its affiliates disclaim any warranty or liability relating to this information or the use thereof.The use of this information is governed by the Terms of Use, available at https://www.[employer].com/en/know/clinical-effectiveness-terms. 2024© UpToDate, Inc. and its affiliates and/or licensors. All rights reserved.  Copyright   © 2024 Up[employer]Date, Inc. and/or its affiliates. All rights reserved.

## 2024-11-13 NOTE — PROGRESS NOTES
Adult Annual Physical  Name: Reuben Duffy      : 2000      MRN: [id number]  Encounter Provider: Shell Tay PA-C  Encounter Date: 2024   Encounter department: [facility] [place]    Assessment & Plan  Annual physical exam  Healthy 24 year old male   Discussed healthy diet and exercise habits  Discussed appropriate age based screenings  Immunizations reviewed  Patient states he had tetanus within the last 10 years, will try to get vaccine records  He also had 1 dose of HPV, but did not complete the series.  Second dose of HPV given today, third dose due in 6 months  Routine fasting labs ordered           Immunizations and preventive care screenings were discussed with patient today. Appropriate education was printed on patient's after visit summary.    Counseling:  Alcohol/drug use: discussed moderation in alcohol intake, the recommendations for healthy alcohol use, and avoidance of illicit drug use.  Dental Health: discussed importance of regular tooth brushing, flossing, and dental visits.  Injury prevention: discussed safety/seat belts, safety helmets, smoke detectors, carbon monoxide detectors, and smoking near bedding or upholstery.  Sexual health: discussed sexually transmitted diseases, partner selection, use of condoms, avoidance of unintended pregnancy, and contraceptive alternatives.  Exercise: the importance of regular exercise/physical activity was discussed. Recommend exercise 3-5 times per week for at least 30 minutes.          History of Present Illness     Adult Annual Physical:  Patient presents for annual physical. Patient is a 24-year-old male presenting to the office for 4-week follow-up and annual physical  Labs not completed    Works as a   Tobacco: zyn  Alcohol: denies  Drugs: denies  Caffeine: pre-workout 5x per week   .     Diet and Physical Activity:  - Diet/Nutrition: well balanced diet and limited junk food.  - Exercise: 5-7  "times a week on average, moderate cardiovascular exercise and strength training exercises.    General Health:  - Sleep: sleeps well and 4-6 hours of sleep on average.  - Hearing: normal hearing bilateral ears.  - Vision: no vision problems.  - Dental: brushes teeth twice daily and no dental visits for > 1 year.     Health:  - History of STDs: no.   - Urinary symptoms: none.         Review of Systems   Constitutional:  Negative for chills, fatigue and fever.   HENT:  Negative for congestion, ear pain, postnasal drip, rhinorrhea, sinus pressure, sore throat and trouble swallowing.    Eyes:  Negative for pain and visual disturbance.   Respiratory:  Negative for cough, chest tightness, shortness of breath and wheezing.    Cardiovascular:  Negative for chest pain, palpitations and leg swelling.   Gastrointestinal:  Negative for abdominal pain, blood in stool, nausea and vomiting.   Genitourinary:  Negative for difficulty urinating, dysuria and hematuria.   Neurological:  Negative for dizziness, weakness, light-headedness, numbness and headaches.   Psychiatric/Behavioral:  Negative for dysphoric mood and sleep disturbance. The patient is not nervous/anxious.    All other systems reviewed and are negative.    Medical History Reviewed by provider this encounter:  Tobacco  Allergies  Meds  Problems  Med Hx  Surg Hx  Fam Hx         Objective     /70 (BP Location: Left arm, Patient Position: Sitting, Cuff Size: Adult)   Pulse 82   Temp 99.4 °F (37.4 °C) (Temporal)   Ht 6' 1.33\" (1.863 m)   Wt 96.9 kg (213 lb 9.6 oz)   SpO2 98%   BMI 27.93 kg/m²     Physical Exam  Vitals and nursing note reviewed.   Constitutional:       General: He is not in acute distress.     Appearance: Normal appearance. He is not ill-appearing.   HENT:      Head: Normocephalic and atraumatic.      Right Ear: Tympanic membrane, ear canal and external ear normal.      Left Ear: Tympanic membrane, ear canal and external ear normal.      " Nose: Nose normal. No rhinorrhea.      Mouth/Throat:      Mouth: Mucous membranes are moist.      Pharynx: Oropharynx is clear. No oropharyngeal exudate or posterior oropharyngeal erythema.   Eyes:      General: No scleral icterus.     Extraocular Movements: Extraocular movements intact.      Conjunctiva/sclera: Conjunctivae normal.      Pupils: Pupils are equal, round, and reactive to light.   Cardiovascular:      Rate and Rhythm: Normal rate and regular rhythm.      Heart sounds: Normal heart sounds. No murmur heard.     No friction rub. No gallop.   Pulmonary:      Effort: Pulmonary effort is normal. No respiratory distress.      Breath sounds: Normal breath sounds. No wheezing, rhonchi or rales.   Chest:      Chest wall: No tenderness.   Musculoskeletal:      Cervical back: Normal range of motion. No tenderness.      Right lower leg: No edema.      Left lower leg: No edema.   Lymphadenopathy:      Cervical: No cervical adenopathy.   Skin:     General: Skin is warm and dry.      Coloration: Skin is not pale.      Findings: No erythema, lesion or rash.   Neurological:      General: No focal deficit present.      Mental Status: He is alert and oriented to person, place, and time. Mental status is at baseline.      Motor: No weakness.      Coordination: Coordination normal.   Psychiatric:         Mood and Affect: Mood normal.         Behavior: Behavior normal.       Administrative Statements     Disclaimer: This note was generated with voice recognition software.  Phonetic, grammatical, and spelling errors may be present as a result.  Please contact provider with any concerns or questions

## 2024-11-15 ENCOUNTER — OFFICE VISIT (OUTPATIENT)
Dept: INTERNAL MEDICINE CLINIC | Facility: OTHER | Age: 24
End: 2024-11-15
Payer: COMMERCIAL

## 2024-11-15 VITALS
TEMPERATURE: 98.8 F | HEIGHT: 73 IN | BODY MASS INDEX: 28.72 KG/M2 | OXYGEN SATURATION: 97 % | SYSTOLIC BLOOD PRESSURE: 116 MMHG | DIASTOLIC BLOOD PRESSURE: 68 MMHG | WEIGHT: 216.7 LBS | HEART RATE: 76 BPM
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
## 2024-11-15 DIAGNOSIS — M79.642 LEFT HAND PAIN: ICD-10-CM

## 2024-11-15 DIAGNOSIS — M25.512 LEFT SHOULDER PAIN, UNSPECIFIED CHRONICITY: Primary | ICD-10-CM

## 2024-11-15 PROCEDURE — 99213 OFFICE O/P EST LOW 20 MIN: CPT
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
## 2024-12-19 ENCOUNTER — EVALUATION (OUTPATIENT)
Dept: PHYSICAL THERAPY | Facility: REHABILITATION | Age: 24
End: 2024-12-19
Payer: COMMERCIAL

## 2024-12-19 DIAGNOSIS — M79.642 LEFT HAND PAIN: ICD-10-CM

## 2024-12-19 DIAGNOSIS — M25.512 LEFT SHOULDER PAIN, UNSPECIFIED CHRONICITY: ICD-10-CM

## 2024-12-19 PROCEDURE — 97162 PT EVAL MOD COMPLEX 30 MIN: CPT
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
## 2024-12-26 ENCOUNTER — OFFICE VISIT (OUTPATIENT)
Dept: PHYSICAL THERAPY | Facility: REHABILITATION | Age: 24
End: 2024-12-26
Payer: COMMERCIAL

## 2024-12-26 DIAGNOSIS — M79.642 LEFT HAND PAIN: ICD-10-CM

## 2024-12-26 DIAGNOSIS — M25.512 LEFT SHOULDER PAIN, UNSPECIFIED CHRONICITY: Primary | ICD-10-CM

## 2024-12-26 PROCEDURE — 97110 THERAPEUTIC EXERCISES: CPT

## 2024-12-26 NOTE — PROGRESS NOTES
"Daily Note     Today's date: 2024  Patient name: Reuben Duffy  : 2000  MRN: [id number]  Referring provider: Shell Tay PA-C  Dx:   Encounter Diagnosis     ICD-10-CM    1. Left shoulder pain, unspecified chronicity  M25.512       2. Left hand pain  M79.642           Start Time: 1725  Stop Time: 1805  Total time in clinic (min): 40 minutes    Subjective: Patient reports he has some left anterior shoulder pain and left thumb pain with gripping.       Objective: See treatment diary below      Assessment: Patient tolerated treatment well. Slight left shoulder discomfort reported with serratus wall walks today, but otherwise tolerated exercises without complaint.       Plan: Continue per plan of care.        No Authorization Required   Billing Guidelines CMS    Copay/Co-Insurance  $20.00   Visit Limit  BOMN       Precautions: N/A                 Visit number  1 2           FOTO NP            IE/RE IE                Manuals             UT, LS MFD   5'                                                  Shoulder              UBE   L2 3'/3'           Prone ITY   5\" x10 each           Hill ER walk out   6# x10           Wall clock   Green x10           SA wall walk   Green 2x5                                     Hand              Gripper   Black 20x           Finger web flex, ext   Green x20           Putty opposition pinch   20x                                                                            Ther Activity                                       Gait Training                                       Modalities                                         Access Code: [email or web address]  URL: [email or web address]  Date: 2024  Prepared by: Alfreda Galvez    Exercises  - Seated Upper Trapezius Stretch (Mirrored)  - 2 x daily - 7 x weekly - 3 reps - 30 hold  - Gentle Levator Scapulae Stretch  - 2 x daily - 7 x weekly - 3 reps - 30 hold  - Seated Scalene Stretch with Towel (Mirrored)  - 2 x " Patient : Buzz Sequeira Age: 29 year old Sex: female   MRN: [id number] Encounter Date: 7/19/2024      DIAGNOSTIC IMPRESSION(S)    1. Acute post-traumatic headache, not intractable    2. Bacterial vaginosis    3. Pelvic pain in female        HISTORY     Chief Complaint   Patient presents with    Headache Recurrent or Known DX Migraines    Urinary Complaint       Buzz Sequeira is a 29 year old presenting to the emergency department for multiple complaints.  Patient presents with a headache crescendo onset for the past 2 to 3 days.  Is behind her eyes and is a typical migraine for her.  This headache was not thunderclap.  And she has had several headaches like this in the past.  I do not see a formal diagnosis of migraines in the chart.  She took no medicine today.  No photophobia or nausea at this current time.  Secondary complaint is urinary frequency and dysuria.  She also limited vaginal discharge and pelvic pain.  She was checked for STDs about a month ago and on close questioning it was only blood test.  I doubt if she was tested for chlamydia or gonorrhea.  She does have the same sexual partner.        No Known Allergies    No current facility-administered medications for this encounter.     Current Outpatient Medications   Medication Sig    metroNIDAZOLE (Flagyl) 500 MG tablet Take 1 tablet by mouth every 8 hours for 7 days.    Tirzepatide-Weight Management (Zepbound) 7.5 MG/0.5ML Solution Auto-injector Inject 7.5 mg into the skin every 7 days. Indications: OBESITY    cefdinir (OMNICEF) 300 MG capsule Take 300 mg by mouth in the morning and 300 mg in the evening.    Tirzepatide-Weight Management (Zepbound) 5 MG/0.5ML Solution Auto-injector Inject 5 mg into the skin every 7 days. Indications: OBESITY    Tirzepatide-Weight Management 2.5 MG/0.5ML Solution Auto-injector Inject 2.5 mg into the skin every 7 days. Indications: OBESITY    phentermine (Adipex-P) 37.5 MG tablet Take 1 tablet by mouth daily (before  breakfast).    ferrous sulfate 325 (65 FE) MG tablet Take 1 tablet by mouth daily (with breakfast).    psyllium 0.52 g capsule Take 1 capsule by mouth daily.    docusate sodium (Colace) 100 MG capsule Take 1 capsule by mouth in the morning and 1 capsule in the evening.       Past Medical History:   Diagnosis Date    Mild preeclampsia (CMD) 3/3/2014    Prediabetes 6/12/2018       Past Surgical History:   Procedure Laterality Date    Dilation and evacuation  03/01/2013    uncomplicated TAB    Full rout obste care,vaginal deliv      Intrauterine copper contraceptive  05/01/2014    removed 8/4/23    Intrauterine copper contraceptive  08/04/2023    Lot [id number] exp 10/26  to be removed 8/2033    Liposuction  08/2021    Lipo 360 and BBL       Family History   Problem Relation Age of Onset    Patient is unaware of any medical problems Mother     Hypertension Father     Patient is unaware of any medical problems Sister     Patient is unaware of any medical problems Sister     Patient is unaware of any medical problems Sister     Patient is unaware of any medical problems Sister     Patient is unaware of any medical problems Maternal Grandmother     Patient is unaware of any medical problems Maternal Grandfather     Hypertension Paternal Grandmother     Patient is unaware of any medical problems Paternal Grandfather     Cancer Other         No family history of breast, colon or ovarian CA    Patient is unaware of any medical problems Son        Social History     Tobacco Use    Smoking status: Never    Smokeless tobacco: Never   Vaping Use    Vaping status: never used   Substance Use Topics    Alcohol use: No    Drug use: No       REVIEW OF SYSTEMS     Pertinent systems were reviewed and pertinent positive and negative items were documented in the HPI.    PHYSICAL EXAM     ED Triage Vitals [07/19/24 2104]   ED Triage Vitals Group      Temp 97.1 °F (36.2 °C)      Heart Rate 86      Resp 18      /79      SpO2 99 %       daily - 7 x weekly - 3 reps - 30 hold              EtCO2 mmHg       Height 5' 7\" (1.702 m)      Weight 233 lb 4 oz (105.8 kg)      Weight Scale Used Scale in bed      BMI (Calculated) 36.53      IBW/kg (Calculated) 61.6       Physical Exam  Vitals and nursing note reviewed.   Constitutional:       General: She is not in acute distress.     Appearance: Normal appearance. She is not ill-appearing, toxic-appearing or diaphoretic.      Comments: Patient is very pleasant and smiles.  She is in no distress.   HENT:      Head: Normocephalic.      Nose: Nose normal.      Mouth/Throat:      Mouth: Mucous membranes are moist.   Eyes:      General: No scleral icterus.     Extraocular Movements: Extraocular movements intact.      Conjunctiva/sclera: Conjunctivae normal.      Pupils: Pupils are equal, round, and reactive to light.   Cardiovascular:      Rate and Rhythm: Normal rate and regular rhythm.      Heart sounds: No murmur heard.     No gallop.   Pulmonary:      Effort: Pulmonary effort is normal. No respiratory distress.      Breath sounds: No wheezing, rhonchi or rales.   Abdominal:      General: There is no distension.      Tenderness: There is no abdominal tenderness. There is no guarding or rebound.   Genitourinary:     General: Normal vulva.      Vagina: Vaginal discharge (White) present.      Comments: White discharge.  Cervix appears normal.  No cervical motion tenderness.  Uterus adnexa nontender.  No mass.  Musculoskeletal:      Cervical back: No rigidity.   Skin:     General: Skin is warm and dry.   Neurological:      General: No focal deficit present.      Mental Status: She is alert and oriented to person, place, and time.      Cranial Nerves: No cranial nerve deficit.      Motor: No weakness.      Coordination: Coordination normal.      Comments: Speech normal without expressive aphasia or dysarthria.   Psychiatric:         Mood and Affect: Mood normal.         Behavior: Behavior normal.         Thought Content: Thought content normal.         Judgment:  Judgment normal.           PROCEDURE(S)     Procedures    LAB RESULTS     Results for orders placed or performed during the hospital encounter of 07/19/24   Urinalysis & Reflex Microscopy With Culture If Indicated   Result Value Ref Range    COLOR, URINALYSIS Straw     APPEARANCE, URINALYSIS Hazy     GLUCOSE, URINALYSIS Negative Negative mg/dL    BILIRUBIN, URINALYSIS Negative Negative    KETONES, URINALYSIS Negative Negative mg/dL    SPECIFIC GRAVITY, URINALYSIS 1.020 1.005 - 1.030    OCCULT BLOOD, URINALYSIS Negative Negative    PH, URINALYSIS 7.5 (H) 5.0 - 7.0    PROTEIN, URINALYSIS Negative Negative mg/dL    UROBILINOGEN, URINALYSIS 0.2 0.2, 1.0 mg/dL    NITRITE, URINALYSIS Negative Negative    LEUKOCYTE ESTERASE, URINALYSIS Negative Negative   Pregnancy Test, Urine   Result Value Ref Range    Pregnancy, Urine Negative Negative   WET MOUNT   Result Value Ref Range    CLUE CELLS, WET MOUNT Present (A) None Seen    TRICHOMONAS, WET MOUNT None Seen None Seen    YEAST, WET MOUNT None Seen None Seen       EKG         RADIOLOGY RESULTS     Imaging Results    None         ED MEDICATIONS     ED Medication Orders (From admission, onward)      Ordered Start     Status Ordering Provider    07/19/24 2153 07/19/24 2154  diphenhydrAMINE (BENADRYL) injection 25 mg  ONCE         Last MAR action: Given MAURY DUMONT    07/19/24 2112 07/19/24 2113  metoCLOPramide (REGLAN) injection 10 mg  ONCE         Last MAR action: Given MAURY DUMONT            ED COURSE     Vitals:    07/19/24 2104   BP: 138/79   BP Location: RUE - Right upper extremity   Patient Position: Sitting/High-Hart's   Pulse: 86   Resp: 18   Temp: 97.1 °F (36.2 °C)   TempSrc: Temporal   SpO2: 99%   Weight: 105.8 kg (233 lb 4 oz)   Height: 5' 7\" (1.702 m)   LMP: 07/04/2024       ED Course as of 07/19/24 2214 Fri Jul 19, 2024 2123 URINE PREGNANCY,QUAL: Negative [JK]   2123 UA negative [JK]   2200 Pt having mild akathesia. Benadryl 25 mg IVP ordered. [JK]   2213  Headache is now gone. Getting ride home. Wet mount shows clue cells. Will Rx flagyl for 1 week. STD probes pending. F/u PCP next week. [JK]      ED Course User Index  [JK] Cuauhtemoc Argueta MD       No cardiac monitor or imaging reviewed        Consults                    Medical Decision Making  Numerous emergent etiologies have been considered. If performed I have personally and independently interpreted all labs, EKGs, and radiographic studies. I have personally and independently reviewed pertinent previous external notes.     Patient comes in with 2 unrelated complaints.  First is a migraine type headache and she has had many of these before.  There is no change.  She does not have a diagnosis of migraines.  We will have a saline lock started and give her metoclopramide for the discomfort.  Secondly she has little bit of a vaginal discharge and some pelvic pain.  She has dysuria.  Will check UA and pregnancy test.  Will also do a pelvic and check wet mount and GC chlamydia probes.  Patient agrees with this.    Amount and/or Complexity of Data Reviewed  Labs: ordered. Decision-making details documented in ED Course.                                 MDM done in ED Course    Does the Patient have sepsis: NO     CRITICAL CARE       No Critical Care        DISPOSITION       Clinical Impression and Diagnosis  10:14 PM       ED Diagnoses       Diagnosis Comment Associated Orders       Final diagnoses    Acute post-traumatic headache, not intractable -- --    Bacterial vaginosis -- --    Pelvic pain in female -- --            Follow Up:  Nikko Styles MD  [address] [facility] [address]  [phone]    In 3 days      [facility]  [address] [facility] [address] [phone]    If symptoms worsen          Summary of your Discharge Medications        Take these Medications        Details   metroNIDAZOLE 500 MG tablet  Commonly known as: Flagyl   Take 1 tablet by mouth every 8 hours  for 7 days.              Pt is discharged to home/self care in stable condition.              Discharge 7/19/2024 10:10 PM  Buzz Sequeira discharge to home/self care.                  Note to patient: This document is intended as professional documentation of your care and communication among your healthcare providers, and is not necessarily written in lay-person's language. It contains my recollection of the visit, as well as my impression of the problem being addressed. This document is subject to revisions and corrections at my discretion. If you find the note difficult to understand because there are unfamiliar terms or abbreviations, Medline (https://medlineplus.gov/appendixb.html) is a good resource and may help you better understand your condition or medical terms.      Cuauhtemoc Argueta MD  07/19/24 9892

## 2024-12-31 ENCOUNTER — OFFICE VISIT (OUTPATIENT)
Dept: PHYSICAL THERAPY | Facility: REHABILITATION | Age: 24
End: 2024-12-31
Payer: COMMERCIAL

## 2024-12-31 DIAGNOSIS — M25.512 LEFT SHOULDER PAIN, UNSPECIFIED CHRONICITY: Primary | ICD-10-CM

## 2024-12-31 DIAGNOSIS — M79.642 LEFT HAND PAIN: ICD-10-CM

## 2024-12-31 PROCEDURE — 97110 THERAPEUTIC EXERCISES: CPT

## 2024-12-31 PROCEDURE — 97140 MANUAL THERAPY 1/> REGIONS: CPT

## 2024-12-31 PROCEDURE — 97112 NEUROMUSCULAR REEDUCATION: CPT

## 2024-12-31 NOTE — PROGRESS NOTES
"Daily Note     Today's date: 2024  Patient name: Reuben Duffy  : 2000  MRN: [id number]  Referring provider: Shell Tay PA-C  Dx:   Encounter Diagnosis     ICD-10-CM    1. Left shoulder pain, unspecified chronicity  M25.512       2. Left hand pain  M79.642                      Subjective: Pt reports that his shoulder was sore the next day after he was here last but it felt like he did a heavy lift/workout. Pt reports that his hand is doing okay, denies any issues. He reports that he did pull ups yesterday and could still feel his original discomfort in his shoulder to some degree.       Objective: See treatment diary below      Assessment: Pt tolerated treatment well. Pt reported muscle fatigue and soreness in his shoulder during and after exercises however without any increase or reproduction of his usual shoulder pain.Hand exercises were held on today's date due to patient stating his hand was feeling good.  Patient demonstrated fatigue post treatment, exhibited good technique with therapeutic exercises, and would benefit from continued PT.       Plan: Continue per plan of care.  Progress treatment as tolerated.         No Authorization Required   Billing Guidelines CMS    Copay/Co-Insurance  $20.00   Visit Limit  BOMN       Precautions: N/A                Visit number  1 2 3          FOTO NP            IE/RE IE                Manuals             UT, LS MFD   5' L shoulder post, inf mob   ML             L shoulder scap assistance with flex ML                                    Shoulder              UBE   L2 3'/3' L5 3'/3'          Prone ITY   5\" x10 each 5\" x5 ea           Hill ER walk out   6# x10 6# x10              Hill face pull with ER 15.4# x10           Wall clock   Green x10 Blue x10 ea           SA wall walk   Green 2x5 Blue 2x5                                     Hand              Gripper   Black 20x           Finger web flex, ext   Green x20           Putty " opposition pinch   20x                                                                            Ther Activity                                       Gait Training                                       Modalities                                         Access Code: [email or web address]  URL: [email or web address]  Date: 12/19/2024  Prepared by: Alfreda Galvez    Exercises  - Seated Upper Trapezius Stretch (Mirrored)  - 2 x daily - 7 x weekly - 3 reps - 30 hold  - Gentle Levator Scapulae Stretch  - 2 x daily - 7 x weekly - 3 reps - 30 hold  - Seated Scalene Stretch with Towel (Mirrored)  - 2 x daily - 7 x weekly - 3 reps - 30 hold

## 2025-01-07 ENCOUNTER — OFFICE VISIT (OUTPATIENT)
Dept: PHYSICAL THERAPY | Facility: REHABILITATION | Age: 25
End: 2025-01-07
Payer: COMMERCIAL

## 2025-01-07 DIAGNOSIS — M25.512 LEFT SHOULDER PAIN, UNSPECIFIED CHRONICITY: Primary | ICD-10-CM

## 2025-01-07 DIAGNOSIS — M79.642 LEFT HAND PAIN: ICD-10-CM

## 2025-01-07 PROCEDURE — 97110 THERAPEUTIC EXERCISES: CPT

## 2025-01-07 PROCEDURE — 97140 MANUAL THERAPY 1/> REGIONS: CPT

## 2025-01-07 NOTE — PROGRESS NOTES
"Daily Note     Today's date: 2025  Patient name: Reuben Duffy  : 2000  MRN: [id number]  Referring provider: Shell Tay PA-C  Dx:   Encounter Diagnosis     ICD-10-CM    1. Left shoulder pain, unspecified chronicity  M25.512       2. Left hand pain  M79.642                      Subjective: Pt reports that he was able to get 10 dips during his workout without pain in his shoulder so he thinks it's getting better. He reports that his hand is fine and not having any issues.       Objective: See treatment diary below      Assessment: Pt tolerated treatment well. Exercises today focused on shoulder due to pt reporting his hand is feeling fine without issues. Pt did report continued soreness in his shoulder by the end of his exercises but not the same pain he initially reported, he was able to complete everything to his tolerance and without significant cueing for form. Patient demonstrated fatigue post treatment, exhibited good technique with therapeutic exercises, and would benefit from continued PT.       Plan: Continue per plan of care.  Progress treatment as tolerated.         No Authorization Required   Billing Guidelines CMS    Copay/Co-Insurance  $20.00   Visit Limit  BOMN       Precautions: N/A               Visit number  1 2 3 4         FOTO NP            IE/RE IE                Manuals             UT, LS MFD   5' L shoulder post, inf mob   ML L shoulder post, inf mob   ML            L shoulder scap assistance with flex ML                                    Shoulder              UBE   L2 3'/3' L5 3'/3' L6 3'/3'         Prone ITY   5\" x10 each 5\" x5 ea  5\" x5 ea          Thayer ER walk out   6# x10 6# x10  6# 3 steps x10             Hill face pull with ER 15.4# x10  Hill face pull with ER 15.4# 2x10              [place]ayer D1/D2  15.0# 2x10         Wall clock   Green x10 Blue x10 ea  Blue x10 ea          SA wall walk   Green 2x5 Blue 2x5  Blue x10                               "      Hand              Gripper   Black 20x           Finger web flex, ext   Green x20           Putty opposition pinch   20x                                                                            Ther Activity                                       Gait Training                                       Modalities                                         Access Code: [email or web address]  URL: [email or web address]  Date: 12/19/2024  Prepared by: Alfreda Galvez    Exercises  - Seated Upper Trapezius Stretch (Mirrored)  - 2 x daily - 7 x weekly - 3 reps - 30 hold  - Gentle Levator Scapulae Stretch  - 2 x daily - 7 x weekly - 3 reps - 30 hold  - Seated Scalene Stretch with Towel (Mirrored)  - 2 x daily - 7 x weekly - 3 reps - 30 hold

## 2025-01-20 ENCOUNTER — OFFICE VISIT (OUTPATIENT)
Dept: PHYSICAL THERAPY | Facility: REHABILITATION | Age: 25
End: 2025-01-20
Payer: COMMERCIAL

## 2025-01-20 DIAGNOSIS — M25.512 LEFT SHOULDER PAIN, UNSPECIFIED CHRONICITY: Primary | ICD-10-CM

## 2025-01-20 PROCEDURE — 97110 THERAPEUTIC EXERCISES: CPT

## 2025-01-20 PROCEDURE — 97112 NEUROMUSCULAR REEDUCATION: CPT

## 2025-01-20 NOTE — PROGRESS NOTES
"Daily Note     Today's date: 2025  Patient name: Reuben Duffy  : 2000  MRN: [id number]  Referring provider: Shell Tay PA-C  Dx:   Encounter Diagnosis     ICD-10-CM    1. 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 He reports that his shoulder now be at a 3/10 compared to when he started PT where it was at a 7/10. He reports that his shoulder feels like more a soreness now than a pain that it felt like initially. He reports that he still feels it when he does certain activities like working out but other than that he has been okay. Pt reports that his hand is fine and he has not had any remaining issues with it.       Objective: See treatment diary below      Assessment: Pt tolerated treatment well. Pt reported maintained shoulder soreness throughout session however nothing worsened with any exercises. Pt was educated to incorporate wall clocks and SA walks into his HEP to further improve his strength and tolerance to activities, pt verbalized understanding. Patient demonstrated fatigue post treatment, exhibited good technique with therapeutic exercises, and would benefit from continued PT.       Plan: Continue per plan of care.  Progress treatment as tolerated.           Precautions: N/A              Visit number  1 2 3 4 5        FOTO NP            IE/RE IE                Manuals             UT, LS MFD   5' L shoulder post, inf mob   ML L shoulder post, inf mob   ML L shoulder post, inf mob ML            L shoulder scap assistance with flex ML                                    Shoulder              UBE   L2 3'/3' L5 3'/3' L6 3'/3' L6 3'/3'        Prone ITY   5\" x10 each 5\" x5 ea  5\" x5 ea  5\" x 5 ea         Hill ER walk out   6# x10 6# x10  6# 3 steps x10  6# 3 steps x10            Hill face pull with ER 15.4# x10  Hill face pull with ER 15.4# 2x10  " Celina face pull with ER 15.4# 2x10             [place] D1/D2  15.0# 2x10         Wall clock   Green x10 Blue x10 ea  Blue x10 ea  Blue x10 ea         SA wall walk   Green 2x5 Blue 2x5  Blue x10  Blue x10              Supine ABC's 6# x1                      Hand      D/C         Gripper   Black 20x           Finger web flex, ext   Green x20           Putty opposition pinch   20x                                                                            Ther Activity                                       Gait Training                                       Modalities                                         Access Code: [email or web address]  URL: [email or web address]  Date: 12/19/2024  Prepared by: Alfreda Galvez    Exercises  - Seated Upper Trapezius Stretch (Mirrored)  - 2 x daily - 7 x weekly - 3 reps - 30 hold  - Gentle Levator Scapulae Stretch  - 2 x daily - 7 x weekly - 3 reps - 30 hold  - Seated Scalene Stretch with Towel (Mirrored)  - 2 x daily - 7 x weekly - 3 reps - 30 hold

## 2025-05-09 ENCOUNTER — CLINICAL SUPPORT (OUTPATIENT)
Dept: INTERNAL MEDICINE CLINIC | Facility: OTHER | Age: 25
End: 2025-05-09
Payer: COMMERCIAL

## 2025-05-09 DIAGNOSIS — Z23 ENCOUNTER FOR IMMUNIZATION: Primary | ICD-10-CM

## 2025-05-09 PROCEDURE — 90471 IMMUNIZATION ADMIN: CPT

## 2025-05-09 PROCEDURE — 90651 9VHPV VACCINE 2/3 DOSE IM: CPT

## 2025-05-09 NOTE — PROGRESS NOTES
Pt arrived for HPV number 2 vaccine. Vaccine given IM in patients left arm. Patient tolerated the procedure well and was discharged to home with no complications.

## (undated) DEVICE — PLUMEPEN PRO 10FT

## (undated) DEVICE — GLOVE SRG BIOGEL 6.5

## (undated) DEVICE — PAD GROUNDING ADULT

## (undated) DEVICE — INTENDED FOR TISSUE SEPARATION, AND OTHER PROCEDURES THAT REQUIRE A SHARP SURGICAL BLADE TO PUNCTURE OR CUT.: Brand: BARD-PARKER SAFETY BLADES SIZE 15, STERILE

## (undated) DEVICE — ELECTRODE EZ CLEAN BLADE -0012

## (undated) DEVICE — SPONGE STICK WITH PVP-I: Brand: KENDALL

## (undated) DEVICE — STERILE MUSCLE FLAP PACK: Brand: CARDINAL HEALTH

## (undated) DEVICE — PACK PBDS PLASTIC HEAD AND NECK RF

## (undated) DEVICE — ELECTRODE BLADE MOD E-Z CLEAN  2.75IN 7CM -0012AM

## (undated) DEVICE — ELECTRODE NEEDLE MEGAFINE 2IN E-Z CLEAN MEGADYNE -0118

## (undated) DEVICE — NEEDLE 25G X 1 1/2